# Patient Record
Sex: FEMALE | URBAN - METROPOLITAN AREA
[De-identification: names, ages, dates, MRNs, and addresses within clinical notes are randomized per-mention and may not be internally consistent; named-entity substitution may affect disease eponyms.]

---

## 2021-04-27 ENCOUNTER — OFFICE VISIT (OUTPATIENT)
Dept: URBAN - METROPOLITAN AREA CLINIC 75 | Facility: CLINIC | Age: 76
End: 2021-04-27
Payer: COMMERCIAL

## 2021-04-27 DIAGNOSIS — H43.813 VITREOUS DEGENERATION, BILATERAL: ICD-10-CM

## 2021-04-27 DIAGNOSIS — E11.3293 TYPE 2 DIAB W MILD NONPRLF DIABETIC RTNOP W/O MACULAR EDEMA, BILATERAL: Primary | ICD-10-CM

## 2021-04-27 DIAGNOSIS — H02.834 DERMATOCHALASIS OF LEFT UPPER EYELID: ICD-10-CM

## 2021-04-27 DIAGNOSIS — H52.4 PRESBYOPIA: ICD-10-CM

## 2021-04-27 DIAGNOSIS — H02.831 DERMATOCHALASIS OF RIGHT UPPER EYELID: ICD-10-CM

## 2021-04-27 PROCEDURE — 99214 OFFICE O/P EST MOD 30 MIN: CPT | Performed by: OPTOMETRIST

## 2021-04-27 ASSESSMENT — INTRAOCULAR PRESSURE
OS: 23
OD: 22

## 2021-04-27 ASSESSMENT — VISUAL ACUITY
OD: 20/20
OS: 20/25

## 2021-04-27 NOTE — IMPRESSION/PLAN
Impression: Presbyopia: H52.4. Plan: Refraction completed today. Updated glasses prescription dispensed to patient.

## 2021-04-27 NOTE — IMPRESSION/PLAN
Impression: Dermatochalasis of left upper eyelid: H02.834. Plan: Treatment of dermatochalasis involves removal of excess skin and possible fat in the upper lid in order to achieve the desired cosmetic and or functional result. Without surgical management, the condition will not likely improve and may gradually worsen over time. Contact office if experiencing redness, swelling, pain, discharge or difficulty with vision.

## 2021-04-27 NOTE — IMPRESSION/PLAN
Impression: Vitreous degeneration, bilateral: H43.813-Optos performed today. The PVD is stable, and there is no evidence of a retinal tear or detachment on dilated exam Plan: Reviewed the signs and symptoms of a retinal tear and detachment in detail with the patient, including worsening flashes, new floaters, and development of a shadow/curtain in the peripheral visual field. The patient was advised to call immediately with any changes to [facility] or increase in symptoms.

## 2021-04-27 NOTE — IMPRESSION/PLAN
Impression: Type 2 diab w mild nonprlf diabetic rtnop w/o macular edema, bilateral: O87.5311. The clinical exam is consistent with Type 2 DM with mild retinopathy. Plan:  The patient was advised to maintain tight blood sugar, blood pressure, and lipid control, and to see us again in 1 year for a repeat dilated fundus examination.

## 2022-04-29 ENCOUNTER — OFFICE VISIT (OUTPATIENT)
Dept: URBAN - METROPOLITAN AREA CLINIC 75 | Facility: CLINIC | Age: 77
End: 2022-04-29
Payer: COMMERCIAL

## 2022-04-29 DIAGNOSIS — E11.9 TYPE 2 DIABETES MELLITUS WITHOUT COMPLICATIONS: Primary | ICD-10-CM

## 2022-04-29 DIAGNOSIS — D31.31 BENIGN NEOPLASM OF RIGHT CHOROID: ICD-10-CM

## 2022-04-29 DIAGNOSIS — H52.4 PRESBYOPIA: ICD-10-CM

## 2022-04-29 DIAGNOSIS — H35.363 DRUSEN (DEGENERATIVE) OF MACULA, BILATERAL: ICD-10-CM

## 2022-04-29 PROCEDURE — 99214 OFFICE O/P EST MOD 30 MIN: CPT | Performed by: OPTOMETRIST

## 2022-04-29 ASSESSMENT — INTRAOCULAR PRESSURE
OD: 22
OS: 23

## 2022-04-29 NOTE — IMPRESSION/PLAN
Impression: Benign neoplasm of right choroid: D31.31.

 OPTOS ordered and performed No tears or holes found at this time. Plan: The clinical exam and optos photo  is consistent with a choroidal nevus. Fortunately, the lesion does not display any suspicious characteristics. The diagnosis, natural history, and prognosis of choroidal nevi were discussed. The patient understands that there is a small risk of malignant transformation, and the importance of routine dilated eye exams was stressed. Baseline color photos were obtained for future comparison.

## 2022-04-29 NOTE — IMPRESSION/PLAN
Impression: Drusen (degenerative) of macula, bilateral: H35.363. OPTOS ordered and performed No tears or holes found at this time. Plan: There are no specific modalities proven to be effective to treat drusen. A diet rich in green vegetables and/or eye vitamins is recommended. The drusen may increase with time and to try to decrease progression the recommendations above should be followed. If drusen progress they can cause decrease in vision or distortion of vision. Drusen may be an early sign of macular degeneration and this diagnosis may be possible in future. Avoidance of smoking or smoking cessation is recommended. Contact office if you experience decrease in vision, blurred vision, distortion in vision or concerning changes in vision.

## 2022-04-29 NOTE — IMPRESSION/PLAN
Impression: Type 2 diabetes mellitus without complications: Z18.5. OPTOS ordered and performed Neg hemes Plan: Diabetes type II: no background retinopathy, no signs of neovascularization noted. Discussed ocular and systemic benefits of blood sugar control.

## 2023-09-14 ENCOUNTER — OFFICE VISIT (OUTPATIENT)
Dept: URBAN - METROPOLITAN AREA CLINIC 75 | Facility: CLINIC | Age: 78
End: 2023-09-14
Payer: COMMERCIAL

## 2023-09-14 DIAGNOSIS — E11.9 TYPE 2 DIABETES MELLITUS WITHOUT COMPLICATIONS: Primary | ICD-10-CM

## 2023-09-14 DIAGNOSIS — H52.4 PRESBYOPIA: ICD-10-CM

## 2023-09-14 DIAGNOSIS — H43.813 VITREOUS DEGENERATION, BILATERAL: ICD-10-CM

## 2023-09-14 PROCEDURE — 99213 OFFICE O/P EST LOW 20 MIN: CPT | Performed by: OPTOMETRIST

## 2023-09-14 ASSESSMENT — VISUAL ACUITY
OD: 20/30
OS: 20/25

## 2023-09-14 ASSESSMENT — INTRAOCULAR PRESSURE
OS: 15
OD: 17

## 2024-09-12 ENCOUNTER — OFFICE VISIT (OUTPATIENT)
Dept: URBAN - METROPOLITAN AREA CLINIC 75 | Facility: CLINIC | Age: 79
End: 2024-09-12
Payer: MEDICARE

## 2024-09-12 DIAGNOSIS — H52.4 PRESBYOPIA: ICD-10-CM

## 2024-09-12 DIAGNOSIS — H40.013 OPEN ANGLE WITH BORDERLINE FINDINGS, LOW RISK, BILATERAL: ICD-10-CM

## 2024-09-12 DIAGNOSIS — D31.31 BENIGN NEOPLASM OF RIGHT CHOROID: ICD-10-CM

## 2024-09-12 DIAGNOSIS — H43.813 VITREOUS DEGENERATION, BILATERAL: ICD-10-CM

## 2024-09-12 DIAGNOSIS — E11.9 TYPE 2 DIABETES MELLITUS WITHOUT COMPLICATIONS: Primary | ICD-10-CM

## 2024-09-12 PROCEDURE — 99214 OFFICE O/P EST MOD 30 MIN: CPT | Performed by: OPTOMETRIST

## 2024-09-12 ASSESSMENT — INTRAOCULAR PRESSURE
OS: 20
OD: 20

## 2024-09-12 ASSESSMENT — VISUAL ACUITY
OS: 20/20
OD: 20/25